# Patient Record
Sex: FEMALE | Race: BLACK OR AFRICAN AMERICAN | NOT HISPANIC OR LATINO | Employment: FULL TIME | URBAN - METROPOLITAN AREA
[De-identification: names, ages, dates, MRNs, and addresses within clinical notes are randomized per-mention and may not be internally consistent; named-entity substitution may affect disease eponyms.]

---

## 2017-03-02 ENCOUNTER — HOSPITAL ENCOUNTER (EMERGENCY)
Facility: OTHER | Age: 27
Discharge: HOME | End: 2017-03-02
Attending: EMERGENCY MEDICINE
Payer: MEDICAID

## 2017-03-02 VITALS
TEMPERATURE: 98 F | RESPIRATION RATE: 12 BRPM | HEART RATE: 88 BPM | WEIGHT: 267 LBS | OXYGEN SATURATION: 99 % | DIASTOLIC BLOOD PRESSURE: 80 MMHG | HEIGHT: 67 IN | SYSTOLIC BLOOD PRESSURE: 131 MMHG | BODY MASS INDEX: 41.91 KG/M2

## 2017-03-02 DIAGNOSIS — R07.9 CHEST PAIN: ICD-10-CM

## 2017-03-02 DIAGNOSIS — R05.9 COUGH: Primary | ICD-10-CM

## 2017-03-02 LAB
B-HCG UR QL: NEGATIVE
CTP QC/QA: YES

## 2017-03-02 PROCEDURE — 81025 URINE PREGNANCY TEST: CPT | Performed by: EMERGENCY MEDICINE

## 2017-03-02 PROCEDURE — 25000003 PHARM REV CODE 250: Performed by: PHYSICIAN ASSISTANT

## 2017-03-02 PROCEDURE — 93010 ELECTROCARDIOGRAM REPORT: CPT | Mod: ,,, | Performed by: INTERNAL MEDICINE

## 2017-03-02 PROCEDURE — 99284 EMERGENCY DEPT VISIT MOD MDM: CPT

## 2017-03-02 RX ORDER — BENZONATATE 100 MG/1
100 CAPSULE ORAL 3 TIMES DAILY PRN
Qty: 20 CAPSULE | Refills: 0 | Status: SHIPPED | OUTPATIENT
Start: 2017-03-02 | End: 2017-03-12

## 2017-03-02 RX ORDER — IBUPROFEN 600 MG/1
600 TABLET ORAL
Status: COMPLETED | OUTPATIENT
Start: 2017-03-02 | End: 2017-03-02

## 2017-03-02 RX ORDER — NAPROXEN 500 MG/1
500 TABLET ORAL 2 TIMES DAILY WITH MEALS
Qty: 20 TABLET | Refills: 0 | Status: SHIPPED | OUTPATIENT
Start: 2017-03-02

## 2017-03-02 RX ADMIN — IBUPROFEN 600 MG: 600 TABLET, FILM COATED ORAL at 07:03

## 2017-03-02 NOTE — ED AVS SNAPSHOT
[facility]  [address] [address]               Keri Russell   3/2/2017  6:22 PM   ED    Description:  Female : 1990   Department:  [facility]           Your Care was Coordinated By:     Provider Role From To    Josh Ortiz II, MD Attending Provider 17 --    Nichelle Anderson PA-C Physician Assistant 17 --      Reason for Visit     Cough           Diagnoses this Visit        Comments    Cough    -  Primary     Chest pain           ED Disposition     None           To Do List           Follow-up Information     Follow up with Linda Benavidez MD In 2 days.    Specialty:  General Practice    Contact information:    [address] [place] [address] [phone]          Follow up with [facility].    Specialty:  Emergency Medicine    Why:  If symptoms worsen    Contact information:    [address] [facility] [address] [phone]       These Medications        Disp Refills Start End    benzonatate (TESSALON) 100 MG capsule 20 capsule 0 3/2/2017 3/12/2017    Take 1 capsule (100 mg total) by mouth 3 (three) times daily as needed for Cough. - Oral    naproxen (NAPROSYN) 500 MG tablet 20 tablet 0 3/2/2017     Take 1 tablet (500 mg total) by mouth 2 (two) times daily with meals. - Oral      [facility]     [facility] -  Assistance  Registered nurses in the [facility] provide clinical advisement, health education, appointment booking, and other advisory services.  Call for this free service at [phone].             Medications           Message regarding Medications     Verify the changes and/or additions to your medication regime listed below are the same as discussed with your clinician today.  If any of these changes or additions are incorrect, please notify your healthcare provider.        START taking these NEW  "medications        Refills    benzonatate (TESSALON) 100 MG capsule 0    Sig: Take 1 capsule (100 mg total) by mouth 3 (three) times daily as needed for Cough.    Class: Print    Route: Oral    naproxen (NAPROSYN) 500 MG tablet 0    Sig: Take 1 tablet (500 mg total) by mouth 2 (two) times daily with meals.    Class: Print    Route: Oral      These medications were administered today        Dose Freq    ibuprofen tablet 600 mg 600 mg ED 1 Time    Sig: Take 1 tablet (600 mg total) by mouth ED 1 Time.    Class: Normal    Route: Oral      STOP taking these medications     cyclopentolate 1% (CYCLOGYL) 1 % ophthalmic solution Place 2 drops into the left eye every 6 to 8 hours as needed.           Verify that the below list of medications is an accurate representation of the medications you are currently taking.  If none reported, the list may be blank. If incorrect, please contact your healthcare provider. Carry this list with you in case of emergency.           Current Medications     levonorgestrel (MIRENA) 20 mcg/24 hr (5 years) IUD 1 each by Intrauterine route once.    benzonatate (TESSALON) 100 MG capsule Take 1 capsule (100 mg total) by mouth 3 (three) times daily as needed for Cough.    ibuprofen tablet 600 mg Take 1 tablet (600 mg total) by mouth ED 1 Time.    naproxen (NAPROSYN) 500 MG tablet Take 1 tablet (500 mg total) by mouth 2 (two) times daily with meals.           Clinical Reference Information           Your Vitals Were     BP Pulse Temp Resp Height Weight    131/80 (BP Location: Left arm, Patient Position: Sitting) 88 98 °F (36.7 °C) (Oral) 12 5' 7" (1.702 m) 121.1 kg (267 lb)    SpO2 BMI             99% 41.82 kg/m2         Allergies as of 3/2/2017     No Known Allergies      Immunizations Administered on Date of Encounter - 3/2/2017     None      ED Micro, Lab, POCT     Start Ordered       Status Ordering Provider    03/02/17 1817 03/02/17 1816  POCT urine pregnancy  Once      Final result       ED " Imaging Orders     Start Ordered       Status Ordering Provider    03/02/17 1839 03/02/17 1838  X-Ray Chest PA And Lateral  1 time imaging      Final result       Discharge References/Attachments     CHEST PAIN, UNCERTAIN CAUSE (ENGLISH)    COLDS, ADULT SELF-CARE FOR (ENGLISH)      [facility] Sign-Up     Activating your [facility] account is as easy as 1-2-3!     1) Visit [email or web address], select Sign Up Now, enter this activation code and your date of birth, then select Next.  [email or web address]  Expires: 4/16/2017  7:11 PM      2) Create a username and password to use when you visit [facility] in the future and select a security question in case you lose your password and select Next.    3) Enter your e-mail address and click Sign Up!    Additional Information  If you have questions, please e-mail [email or web address] or call [phone] to talk to our [facility] staff. Remember, [facility] is NOT to be used for urgent needs. For medical emergencies, dial 911.          [facility] complies with applicable Federal civil rights laws and does not discriminate on the basis of race, color, national origin, age, disability, or sex.        Language Assistance Services     ATTENTION: Language assistance services are available, free of charge. Please call [phone].      ATENCIÓN: Si habla español, tiene a adams disposición servicios gratuitos de asistencia lingüística. Llame al [phone].     CHÚ Ý: N?u b?n nói Ti?ng Vi?t, có các d?ch v? h? tr? ngôn ng? mi?n phí dành cho b?n. G?i s? [phone].

## 2017-03-03 NOTE — ED TRIAGE NOTES
Patient c/o a productive cough with green phlegm for several days.  Patient stated that today she coughed up mostly blood today and has nasal/chest congestion with chest discomfort when she coughs.

## 2017-03-03 NOTE — ED PROVIDER NOTES
Encounter Date: 3/2/2017       History     Chief Complaint   Patient presents with    Cough     Patient c/o a productive cough with green phlegm for several days.  Patient stated that today she coughed up mostly blood today and has chest discomfort when she coughs.      Review of patient's allergies indicates:  No Known Allergies  HPI Comments: Patient is a 26 y.o. female presenting to the emergency department with complaints of a cough with associated chest pain.  The patient reports that for the past 3 days she's had a persistent productive cough.  She reports that is productive of green sputum, and states that today she noticed blood-tinged sputum.  She does admit to associated chest pain that worsens with coughing.  She states her pain as an 8/10.  She denies shortness of breath.  She denies associated diaphoresis, nausea, vomiting.  She denies tobacco use, hypertension, diabetes, early family cardiac history.  She does report an associated sore throat as well.  She denies taking any medication for her symptoms thus far.    The history is provided by the patient.     History reviewed. No pertinent past medical history.  History reviewed. No pertinent surgical history.  History reviewed. No pertinent family history.  Social History   Substance Use Topics    Smoking status: Never Smoker    Smokeless tobacco: None    Alcohol use No     Review of Systems   Constitutional: Negative for activity change, appetite change, chills, fatigue and fever.   HENT: Positive for sore throat. Negative for congestion, rhinorrhea, sinus pressure, sneezing and trouble swallowing.    Eyes: Negative for photophobia and visual disturbance.   Respiratory: Positive for cough. Negative for chest tightness, shortness of breath and wheezing.    Cardiovascular: Positive for chest pain. Negative for palpitations.   Gastrointestinal: Negative for abdominal pain, constipation, diarrhea, nausea and vomiting.   Genitourinary: Negative for  dysuria, hematuria and urgency.   Musculoskeletal: Negative for back pain, myalgias, neck pain and neck stiffness.   Skin: Negative for color change and wound.   Neurological: Negative for dizziness, weakness, light-headedness, numbness and headaches.   Psychiatric/Behavioral: Negative for agitation and confusion.       Physical Exam   Initial Vitals   BP Pulse Resp Temp SpO2   03/02/17 1813 03/02/17 1813 03/02/17 1813 03/02/17 1813 03/02/17 1813   131/80 88 12 98 °F (36.7 °C) 99 %     Physical Exam    Nursing note and vitals reviewed.  Constitutional: Vital signs are normal. She appears well-developed and well-nourished. She is not diaphoretic. She is cooperative.  Non-toxic appearance. She does not have a sickly appearance. She does not appear ill. No distress.   Well appearing, -American female unaccompanied in the emergency department.  She is speaking in clear and full sentences, moving all extremities.  She is in no acute distress.   HENT:   Head: Normocephalic and atraumatic.   Right Ear: Hearing, tympanic membrane, external ear and ear canal normal.   Left Ear: Hearing, tympanic membrane, external ear and ear canal normal.   Nose: Nose normal.   Mouth/Throat: Oropharynx is clear and moist and mucous membranes are normal.   Eyes: Conjunctivae and EOM are normal.   Neck: Normal range of motion. Neck supple.   Cardiovascular: Normal rate, regular rhythm and normal heart sounds.   Pulmonary/Chest: Breath sounds normal. No respiratory distress. She has no wheezes. She has no rhonchi. She has no rales.   Mild reproducible tenderness to palpation of the anterior chest wall at the mid sternum with no palpable deformity, crepitus, step-off.  No overlying skin changes   Abdominal: Soft. Bowel sounds are normal. She exhibits no distension. There is no tenderness. There is no rebound and no guarding.   Musculoskeletal: Normal range of motion.   Neurological: She is alert and oriented to person, place, and time.  GCS eye subscore is 4. GCS verbal subscore is 5. GCS motor subscore is 6.   Skin: Skin is warm.   Psychiatric: She has a normal mood and affect. Her behavior is normal. Judgment and thought content normal.         ED Course   Procedures  Labs Reviewed   POCT URINE PREGNANCY     EKG Readings: (Independently Interpreted)   Initial Reading: No STEMI. Rhythm: Sinus Arrhythmia. Heart Rate: 86. Ectopy: No Ectopy. Axis: Normal.     Imaging Results         X-Ray Chest PA And Lateral (Final result) Result time:  03/02/17 18:52:47    Final result by Marcelino Colorado MD (03/02/17 18:52:47)    Impression:         No definite acute cardiopulmonary findings noting subtle vascular crowding projected over the right lower lung zone is likely accentuated by shallow inspiratory effort/atelectasis, developing consolidation felt less likely, clinical correlation as warranted.         Electronically signed by: MARCELINO COLORADO MD  Date:     03/02/17  Time:    18:52     Narrative:    Chest PA and lateral    Indication:Chest pain    Comparison:11/1/2014    Findings:  The cardiomediastinal silhouette is within normal limits.  There is no pleural effusion.  The trachea is midline.  The lungs are symmetrically expanded bilaterally without evidence of acute parenchymal process.  There is subtle vascular crowding projected over the right lower lung zone, may be on the basis of shallow inspiratory effort or reflect atelectasis developing consolidation is felt less likely.  No large focal consolidation seen.  There is no pneumothorax.  The osseous structures are unremarkable.             X-Rays:   Independently Interpreted Readings:   Chest X-Ray: Normal heart size.  No infiltrates.  No acute abnormalities.     Medical Decision Making:   Independently Interpreted Test(s):   I have ordered and independently interpreted X-rays - see prior notes.  I have ordered and independently interpreted EKG Reading(s) - see prior notes  Clinical Tests:   Lab  Tests: Ordered and Reviewed  The following lab test(s) were unremarkable: UPT  Radiological Study: Ordered and Reviewed  Medical Tests: Ordered and Reviewed  Other:   I have discussed this case with another health care provider.       <> Summary of the Discussion: Dr. Ortiz  This note was created using Dragon Medical Dictation. There may be typographical errors secondary to dictation.     Urgent evaluation of a 26 y.o. female presenting to the emergency department complaining of cough and chest pain. Patient is afebrile, nontoxic appearing and hemodynamically stable.  Physical exam reveals regular rate and rhythm, lungs are clear to auscultation bilaterally.  Mild reproducible tenderness to palpation of the anterior chest wall no palpable deformity, crepitus, step-off.  Will plan to obtain chest x-ray, EKG and reassess.  EKG shows no acute findings.  Chest x-ray shows no definite signs of consolidation.  Patient is afebrile, lungs are clear to auscultation bilaterally.  I do not have a high suspicion for pneumonia at this time.  The patient will be discharged home with a prescription for Tessalon and naproxen, and counseled on symptomatically care and treatment. The patient was instructed to follow up with a primary care provider in 2 days or to return to the emergency department for worsening symptoms. The treatment plan was discussed with the patient who demonstrated understanding and comfort with plan. The patient's history, physical exam, and plan of care was discussed with and agreed upon with my supervising physician.     History reviewed. No pertinent past medical history.                  ED Course     Clinical Impression:     1. Cough    2. Chest pain       Disposition:   Disposition: Discharged  Condition: Stable       Nichelle Anderson PA-C  03/02/17 1914